# Patient Record
Sex: MALE | Race: WHITE | URBAN - METROPOLITAN AREA
[De-identification: names, ages, dates, MRNs, and addresses within clinical notes are randomized per-mention and may not be internally consistent; named-entity substitution may affect disease eponyms.]

---

## 2017-04-29 ENCOUNTER — RADIANT APPOINTMENT (OUTPATIENT)
Dept: GENERAL RADIOLOGY | Facility: CLINIC | Age: 27
End: 2017-04-29
Attending: PEDIATRICS
Payer: COMMERCIAL

## 2017-04-29 ENCOUNTER — OFFICE VISIT (OUTPATIENT)
Dept: ORTHOPEDICS | Facility: CLINIC | Age: 27
End: 2017-04-29
Payer: COMMERCIAL

## 2017-04-29 VITALS — HEART RATE: 88 BPM | WEIGHT: 161 LBS | RESPIRATION RATE: 15 BRPM | BODY MASS INDEX: 21.34 KG/M2 | HEIGHT: 73 IN

## 2017-04-29 DIAGNOSIS — M25.571 RIGHT ANKLE PAIN, UNSPECIFIED CHRONICITY: ICD-10-CM

## 2017-04-29 DIAGNOSIS — S89.80XA OVERUSE INJURY OF LOWER LEG: ICD-10-CM

## 2017-04-29 DIAGNOSIS — M76.70 PERONEAL TENDINITIS, UNSPECIFIED LATERALITY: ICD-10-CM

## 2017-04-29 DIAGNOSIS — M25.571 RIGHT ANKLE PAIN, UNSPECIFIED CHRONICITY: Primary | ICD-10-CM

## 2017-04-29 PROCEDURE — 73610 X-RAY EXAM OF ANKLE: CPT | Mod: RT | Performed by: PEDIATRICS

## 2017-04-29 PROCEDURE — 99203 OFFICE O/P NEW LOW 30 MIN: CPT | Performed by: PEDIATRICS

## 2017-04-29 NOTE — PROGRESS NOTES
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  Pain has been present for about 1 week.  He began a new job which requires prolonged standing.  He was kicked during soccer a few days ago along the medial aspect of his right ankle.  He has noticed some increase in swelling and soreness along the lateral aspect of his ankle and into the lateral aspect of his foot.  Right is much greater than the left.  Patient  began walking 10 hours a day because of his new job, which he just began about 2 weeks ago.  Patient confirms pain at rest. Patient does not think his pain was because he was kicked.  Patient has been wear a brace, during the first few hours of work, bracing helps, but by the end of his shift pain has increased.        Injury: overuse    Location of Pain: right lateral ankle  Duration of Pain: 1 week(s)  Rating of Pain at worst: 10/10  Rating of Pain Currently: 8/10 right 6/10 left  Symptoms are better with: Nothing  Symptoms are worse with: weight bearing  Additional Features:   Positive: swelling, bruising and popping   Negative: grinding, catching, locking, instability, paresthesias, numbness, weakness, pain in other joints and systemic symptoms  Other evaluation and/or treatments so far consists of: Nothing  Prior History of related problems: denies    Social History:      Review of Systems  Musculoskeletal: as above  Remainder of review of systems is negative including constitutional, CV, pulmonary, GI, Skin and Neurologic except as noted in HPI or medical history.    No past medical history on file.  No past surgical history on file.  No family history on file.  Social History     Social History     Marital status: Single     Spouse name: N/A     Number of children: N/A     Years of education: N/A     Occupational History     Not on file.     Social History Main Topics     Smoking  "status: Current Every Day Smoker     Smokeless tobacco: Not on file     Alcohol use Not on file     Drug use: Not on file     Sexual activity: Not on file     Other Topics Concern     Not on file     Social History Narrative     No narrative on file       Objective  Pulse 88  Resp 15  Ht 6' 1\" (1.854 m)  Wt 161 lb (73 kg)  BMI 21.24 kg/m2    GENERAL APPEARANCE: healthy, alert and no distress   GAIT: antalgic  SKIN: no suspicious lesions or rashes  NEURO: Normal strength and tone, mentation intact and speech normal  PSYCH:  mentation appears normal and affect normal/bright  HEENT: no scleral icterus  CV: no extremity edema   RESP: nonlabored breathing        Bilateral Ankle Exam:    Inspection:       No ecchymosis       Mild diffuse lateral ankle and hindfoot swelling, right greater than left    Foot inspection:       no deformity noted    ROM:        ankle dorsiflexion  limited, neutral        plantarflexion moderately limited        inversion moderate limitation, more pain active        eversion moderate limitation, pain with active; less pain with passive    Strength:       ankle dorsiflexion 5/5       plantarflexion 4+/5       inversion 4+/5       eversion 4/5    Tender:       lateral malleolus, right       lateral ankle ligaments       peroneal tendon sheath, bilateral    Non-Tender:       remainder of foot and ankle    Skin:       well perfused       capillary refill brisk      Gait:       increased pain with attempt at rising on toes       antalgic gait    Proprioception:   Not assessed    Sensation:  Grossly intact to light touch    Regional pulses:  Normal  Refill brisk          Radiology:  Visualized radiographs of right ankle obtained today, and reviewed the images with the patient.  Impression: Three views of the right ankle demonstrate no acute osseous abnormality. Mortise appears intact.      Assessment:  1. Right ankle pain, unspecified chronicity    2. Overuse injury of lower leg    3. Peroneal " tendinitis, unspecified laterality     bilateral, right greater than left    Plan:  Discussed the assessment with the patient.    Radiologic images reviewed and discussed with patient today     We discussed the following treatment options: symptom treatment, activity modification/rest, imaging, rehab, medication and support for the affected area. Following discussion, plan:    Topical Treatments: Ice routinely   Elevate as needed for swelling, soreness  Over the counter medication: Ibuprofen (Advil) maximum of 800mg three times a day with food; discussed routine use of NSAIDs, he has OTC medication already  Activity Modification: as discussed; does not desire specific work restrictions at this time, given at a new job for the past 2 weeks  Rehab: Home Exercise Program provided   Discussed potential for physical therapy, pending course with NSAIDs and time  Medical Equipment: ankle brace shown; he may consider support on his own, declined brace, stirrup, boot today  Follow up: prn, if not improving next 1-2 weeks with above  Questions answered. The patient indicates understanding of these issues and agrees with the plan.    Jose Navarro DO, JAZZMINE            Disclaimer: This note consists of symbols derived from keyboarding, dictation and/or voice recognition software. As a result, there may be errors in the script that have gone undetected. Please consider this when interpreting information found in this chart.    The information in this document, created by the medical scribe for me, accurately reflects the services I personally performed and the decisions made by me. I have reviewed and approved this document for accuracy.   Jose Navarro DO, JAZZMINE

## 2017-04-29 NOTE — NURSING NOTE
"Chief Complaint   Patient presents with     Musculoskeletal Problem     bilateral foot pain       Initial Pulse 88  Resp 15  Ht 6' 1\" (1.854 m)  Wt 161 lb (73 kg)  BMI 21.24 kg/m2 Estimated body mass index is 21.24 kg/(m^2) as calculated from the following:    Height as of this encounter: 6' 1\" (1.854 m).    Weight as of this encounter: 161 lb (73 kg).  Medication Reconciliation: complete     Patient was given home exercise program at the direction of DAJUAN MENJIVAR  that included the following exercise(s) :    Exercise(s) lateral ankle stretching, calf stretching    Repititions: hold 5-7 sec, repeat 3-5    Times per day: 1-2    Patient demonstrated understanding of and the ability to perform these exercises. Patient was seen for 5 minutes to provide this home exercise program. Patient was directed to discontinue any exercises that cause pain, and to call the clinic if any questions.    "

## 2017-04-29 NOTE — PATIENT INSTRUCTIONS
Over the counter medication: Ibuprofen (Advil) maximum of 800mg (4 tablets) three times a day up to every 8 hours with food    Ice routinely, 10-15 minutes as a time to help alleviate swelling.

## 2017-04-29 NOTE — NURSING NOTE
"Chief Complaint   Patient presents with     Musculoskeletal Problem     bilateral foot pain       Initial Pulse 88  Resp 15  Ht 6' 1\" (1.854 m)  Wt 161 lb (73 kg)  BMI 21.24 kg/m2 Estimated body mass index is 21.24 kg/(m^2) as calculated from the following:    Height as of this encounter: 6' 1\" (1.854 m).    Weight as of this encounter: 161 lb (73 kg).  Medication Reconciliation: complete  "

## 2017-04-29 NOTE — Clinical Note
FYI, Van Severson was seen in [facility] clinic for his bilateral ankle pain, overuse. NSAIDs, stretching, time. Please see copy of the chart note for additional details. Thanks.

## 2017-04-29 NOTE — MR AVS SNAPSHOT
"              After Visit Summary   4/29/2017    Van Severson    MRN: [id number]           Patient Information     Date Of Birth          1990        Visit Information        Provider Department      4/29/2017 11:20 AM Jose Navarro,  [facility]        Today's Diagnoses     Right ankle pain, unspecified chronicity    -  1      Care Instructions    Over the counter medication: Ibuprofen (Advil) maximum of 800mg (4 tablets) three times a day up to every 8 hours with food    Ice routinely, 10-15 minutes as a time to help alleviate swelling.         Follow-ups after your visit        Who to contact     If you have questions or need follow up information about today's clinic visit or your schedule please contact [facility] directly at [phone].  Normal or non-critical lab and imaging results will be communicated to you by MyChart, letter or phone within 4 business days after the clinic has received the results. If you do not hear from us within 7 days, please contact the clinic through [facility] or phone. If you have a critical or abnormal lab result, we will notify you by phone as soon as possible.  Submit refill requests through [employer] or call your pharmacy and they will forward the refill request to us. Please allow 3 business days for your refill to be completed.          Additional Information About Your Visit        MyChart Information     [employer] lets you send messages to your doctor, view your test results, renew your prescriptions, schedule appointments and more. To sign up, go to [email or web address] . Click on \"Log in\" on the left side of the screen, which will take you to the Welcome page. Then click on \"Sign up Now\" on the right side of the page.     You will be asked to enter the access code listed below, as well as some personal information. Please follow the directions to create your username and password.     Your " "access code is: [email or web address]  Expires: 2017 12:59 PM     Your access code will  in 90 days. If you need help or a new code, please call your [place] clinic or [phone].        Care EveryWhere ID     This is your Care EveryWhere ID. This could be used by other organizations to access your [place] medical records  [id number]        Your Vitals Were     Pulse Respirations Height BMI (Body Mass Index)          88 15 6' 1\" (1.854 m) 21.24 kg/m2         Blood Pressure from Last 3 Encounters:   No data found for BP    Weight from Last 3 Encounters:   17 161 lb (73 kg)               Primary Care Provider Office Phone # Fax #    Zack Vazquez [phone]       [facility] [address] [facility] [address]        Thank you!     Thank you for choosing [facility]  for your care. Our goal is always to provide you with excellent care. Hearing back from our patients is one way we can continue to improve our services. Please take a few minutes to complete the written survey that you may receive in the mail after your visit with us. Thank you!             Your Updated Medication List - Protect others around you: Learn how to safely use, store and throw away your medicines at www.disposemymeds.org.      Notice  As of 2017 12:59 PM    You have not been prescribed any medications.      "